# Patient Record
Sex: FEMALE | HISPANIC OR LATINO
[De-identification: names, ages, dates, MRNs, and addresses within clinical notes are randomized per-mention and may not be internally consistent; named-entity substitution may affect disease eponyms.]

---

## 2019-02-20 ENCOUNTER — RX ONLY (RX ONLY)
Age: 44
End: 2019-02-20

## 2019-02-20 ENCOUNTER — DOCTOR'S OFFICE (OUTPATIENT)
Dept: URBAN - METROPOLITAN AREA CLINIC 136 | Facility: CLINIC | Age: 44
Setting detail: OPHTHALMOLOGY
End: 2019-02-20
Payer: COMMERCIAL

## 2019-02-20 DIAGNOSIS — S05.21XD: ICD-10-CM

## 2019-02-20 PROCEDURE — 92004 COMPRE OPH EXAM NEW PT 1/>: CPT | Performed by: OPHTHALMOLOGY

## 2019-02-20 ASSESSMENT — CONFRONTATIONAL VISUAL FIELD TEST (CVF)
OS_FINDINGS: FULL
OD_FINDINGS: CONSTRICTION

## 2019-02-21 ASSESSMENT — REFRACTION_MANIFEST
OS_VA2: 20/
OD_VA3: 20/
OD_VA1: 20/
OD_VA1: 20/
OS_VA3: 20/
OS_VA1: 20/
OD_VA3: 20/
OU_VA: 20/
OU_VA: 20/
OS_VA3: 20/
OS_VA2: 20/
OS_VA1: 20/
OD_VA2: 20/
OD_VA2: 20/

## 2019-02-21 ASSESSMENT — VISUAL ACUITY
OD_BCVA: 20/20
OS_BCVA: 20/NLP

## 2019-02-21 ASSESSMENT — REFRACTION_CURRENTRX
OD_OVR_VA: 20/
OS_OVR_VA: 20/
OS_OVR_VA: 20/
OD_OVR_VA: 20/
OD_OVR_VA: 20/
OS_OVR_VA: 20/

## 2019-09-08 ENCOUNTER — APPOINTMENT (EMERGENCY)
Dept: CT IMAGING | Facility: HOSPITAL | Age: 44
End: 2019-09-08
Payer: COMMERCIAL

## 2019-09-08 ENCOUNTER — HOSPITAL ENCOUNTER (EMERGENCY)
Facility: HOSPITAL | Age: 44
Discharge: HOME | End: 2019-09-08
Attending: EMERGENCY MEDICINE | Admitting: EMERGENCY MEDICINE
Payer: COMMERCIAL

## 2019-09-08 VITALS
TEMPERATURE: 97.9 F | HEART RATE: 58 BPM | SYSTOLIC BLOOD PRESSURE: 145 MMHG | OXYGEN SATURATION: 99 % | BODY MASS INDEX: 38.13 KG/M2 | DIASTOLIC BLOOD PRESSURE: 87 MMHG | HEIGHT: 65 IN | RESPIRATION RATE: 16 BRPM | WEIGHT: 228.84 LBS

## 2019-09-08 DIAGNOSIS — K42.9 UMBILICAL HERNIA WITHOUT OBSTRUCTION OR GANGRENE: Primary | ICD-10-CM

## 2019-09-08 LAB
ALBUMIN SERPL BCP-MCNC: 3.4 G/DL (ref 3.5–5)
ALP SERPL-CCNC: 89 U/L (ref 46–116)
ALT SERPL W P-5'-P-CCNC: 30 U/L (ref 12–78)
ANION GAP SERPL CALCULATED.3IONS-SCNC: 5 MMOL/L (ref 4–13)
AST SERPL W P-5'-P-CCNC: 31 U/L (ref 5–45)
BACTERIA UR QL AUTO: ABNORMAL /HPF
BASOPHILS # BLD AUTO: 0.02 THOUSANDS/ΜL (ref 0–0.1)
BASOPHILS NFR BLD AUTO: 0 % (ref 0–1)
BILIRUB SERPL-MCNC: 0.3 MG/DL (ref 0.2–1)
BILIRUB UR QL STRIP: NEGATIVE
BUN SERPL-MCNC: 14 MG/DL (ref 5–25)
CALCIUM SERPL-MCNC: 8.6 MG/DL (ref 8.3–10.1)
CHLORIDE SERPL-SCNC: 105 MMOL/L (ref 100–108)
CLARITY UR: CLEAR
CO2 SERPL-SCNC: 26 MMOL/L (ref 21–32)
COLOR UR: YELLOW
CREAT SERPL-MCNC: 1.01 MG/DL (ref 0.6–1.3)
EOSINOPHIL # BLD AUTO: 0.1 THOUSAND/ΜL (ref 0–0.61)
EOSINOPHIL NFR BLD AUTO: 2 % (ref 0–6)
ERYTHROCYTE [DISTWIDTH] IN BLOOD BY AUTOMATED COUNT: 14 % (ref 11.6–15.1)
EXT PREG TEST URINE: NEGATIVE
EXT. CONTROL ED NAV: NORMAL
GFR SERPL CREATININE-BSD FRML MDRD: 69 ML/MIN/1.73SQ M
GLUCOSE SERPL-MCNC: 97 MG/DL (ref 65–140)
GLUCOSE UR STRIP-MCNC: NEGATIVE MG/DL
HCT VFR BLD AUTO: 37.9 % (ref 34.8–46.1)
HGB BLD-MCNC: 12.1 G/DL (ref 11.5–15.4)
HGB UR QL STRIP.AUTO: NEGATIVE
IMM GRANULOCYTES # BLD AUTO: 0.01 THOUSAND/UL (ref 0–0.2)
IMM GRANULOCYTES NFR BLD AUTO: 0 % (ref 0–2)
KETONES UR STRIP-MCNC: NEGATIVE MG/DL
LEUKOCYTE ESTERASE UR QL STRIP: NEGATIVE
LIPASE SERPL-CCNC: 214 U/L (ref 73–393)
LYMPHOCYTES # BLD AUTO: 0.69 THOUSANDS/ΜL (ref 0.6–4.47)
LYMPHOCYTES NFR BLD AUTO: 13 % (ref 14–44)
MCH RBC QN AUTO: 26.7 PG (ref 26.8–34.3)
MCHC RBC AUTO-ENTMCNC: 31.9 G/DL (ref 31.4–37.4)
MCV RBC AUTO: 84 FL (ref 82–98)
MONOCYTES # BLD AUTO: 0.48 THOUSAND/ΜL (ref 0.17–1.22)
MONOCYTES NFR BLD AUTO: 9 % (ref 4–12)
NEUTROPHILS # BLD AUTO: 4.09 THOUSANDS/ΜL (ref 1.85–7.62)
NEUTS SEG NFR BLD AUTO: 76 % (ref 43–75)
NITRITE UR QL STRIP: NEGATIVE
NON-SQ EPI CELLS URNS QL MICRO: ABNORMAL /HPF
NRBC BLD AUTO-RTO: 0 /100 WBCS
PH UR STRIP.AUTO: 5.5 [PH] (ref 4.5–8)
PLATELET # BLD AUTO: 183 THOUSANDS/UL (ref 149–390)
PMV BLD AUTO: 9.7 FL (ref 8.9–12.7)
POTASSIUM SERPL-SCNC: 4.2 MMOL/L (ref 3.5–5.3)
PROT SERPL-MCNC: 7.2 G/DL (ref 6.4–8.2)
PROT UR STRIP-MCNC: ABNORMAL MG/DL
RBC # BLD AUTO: 4.54 MILLION/UL (ref 3.81–5.12)
RBC #/AREA URNS AUTO: ABNORMAL /HPF
SODIUM SERPL-SCNC: 136 MMOL/L (ref 136–145)
SP GR UR STRIP.AUTO: 1.02 (ref 1–1.03)
UROBILINOGEN UR QL STRIP.AUTO: 0.2 E.U./DL
WBC # BLD AUTO: 5.39 THOUSAND/UL (ref 4.31–10.16)
WBC #/AREA URNS AUTO: ABNORMAL /HPF

## 2019-09-08 PROCEDURE — 83690 ASSAY OF LIPASE: CPT | Performed by: PHYSICIAN ASSISTANT

## 2019-09-08 PROCEDURE — 99284 EMERGENCY DEPT VISIT MOD MDM: CPT

## 2019-09-08 PROCEDURE — 81025 URINE PREGNANCY TEST: CPT | Performed by: PHYSICIAN ASSISTANT

## 2019-09-08 PROCEDURE — 74177 CT ABD & PELVIS W/CONTRAST: CPT

## 2019-09-08 PROCEDURE — 96374 THER/PROPH/DIAG INJ IV PUSH: CPT

## 2019-09-08 PROCEDURE — 36415 COLL VENOUS BLD VENIPUNCTURE: CPT | Performed by: PHYSICIAN ASSISTANT

## 2019-09-08 PROCEDURE — 85025 COMPLETE CBC W/AUTO DIFF WBC: CPT | Performed by: PHYSICIAN ASSISTANT

## 2019-09-08 PROCEDURE — 81001 URINALYSIS AUTO W/SCOPE: CPT

## 2019-09-08 PROCEDURE — 80053 COMPREHEN METABOLIC PANEL: CPT | Performed by: PHYSICIAN ASSISTANT

## 2019-09-08 PROCEDURE — 96375 TX/PRO/DX INJ NEW DRUG ADDON: CPT

## 2019-09-08 PROCEDURE — 96361 HYDRATE IV INFUSION ADD-ON: CPT

## 2019-09-08 PROCEDURE — 99284 EMERGENCY DEPT VISIT MOD MDM: CPT | Performed by: PHYSICIAN ASSISTANT

## 2019-09-08 RX ORDER — ONDANSETRON 2 MG/ML
4 INJECTION INTRAMUSCULAR; INTRAVENOUS ONCE
Status: COMPLETED | OUTPATIENT
Start: 2019-09-08 | End: 2019-09-08

## 2019-09-08 RX ORDER — KETOROLAC TROMETHAMINE 30 MG/ML
15 INJECTION, SOLUTION INTRAMUSCULAR; INTRAVENOUS ONCE
Status: COMPLETED | OUTPATIENT
Start: 2019-09-08 | End: 2019-09-08

## 2019-09-08 RX ORDER — ONDANSETRON 4 MG/1
4 TABLET, ORALLY DISINTEGRATING ORAL EVERY 6 HOURS PRN
Qty: 12 TABLET | Refills: 0 | Status: SHIPPED | OUTPATIENT
Start: 2019-09-08

## 2019-09-08 RX ADMIN — SODIUM CHLORIDE 500 ML: 0.9 INJECTION, SOLUTION INTRAVENOUS at 20:13

## 2019-09-08 RX ADMIN — ONDANSETRON 4 MG: 2 INJECTION INTRAMUSCULAR; INTRAVENOUS at 20:18

## 2019-09-08 RX ADMIN — IOHEXOL 100 ML: 350 INJECTION, SOLUTION INTRAVENOUS at 21:21

## 2019-09-08 RX ADMIN — KETOROLAC TROMETHAMINE 15 MG: 30 INJECTION, SOLUTION INTRAMUSCULAR at 20:20

## 2019-09-11 NOTE — ED PROVIDER NOTES
Pt Name: Anne Alex  MRN: [id number]  [place]: 1975  Age/Sex: 44 y o  female  Date of evaluation: 9/8/2019  PCP: Dallas Stiles MD    [address] [place] [address]    Chief Complaint   Patient presents with    Abdominal Pain     Pt presents to ED from home w/ abd pain for over a month  Pt had hernia repair over a year ago, pt states having similar pain to before hernia repair  Pt has been evaluated for this pain by multiple hospitals, w/ blood work, CT scans w/o diagnosis  Pt here for another evaluation  HPI    Sofia Mena presents to the Emergency Department complaining of Abdominal Pain, Hernia  Anne Alex is a 44 y o  female who presents due to Abdominal Pain, Hernia  Pt reports abd pain ongoing for over a month, sts she has had previous hernia repair over a year ago sequela lap cholecystectomy, states having similar pain to before incarcerated hernia repair  Pt has been evaluated for this pain by multiple hospitals, w/ blood work, CT scans and was referred to General surgeon with whom she does have an appointment, though sts now pain is more constant over the past few days  Pt here for another evaluation  Denies anorexia, melena, black/bloody stools, vomiting, diarrhea, constipation, obstipation, f/c/s, CP, SOB, back pain, flank pain, dysuria, and no other complaints at this time  History provided by:  Patient   used:  Yes    Abdominal Pain   Pain location:  Periumbilical  Pain quality: aching    Pain radiates to:  Does not radiate  Pain severity:  Moderate  Onset quality:  Gradual  Timing:  Constant  Progression:  Waxing and waning  Chronicity:  New  Context: previous surgery    Context: not alcohol use, not awakening from sleep, not diet changes, not eating, not laxative use, not medication withdrawal, not recent illness, not recent travel, not retching, not sick contacts, not suspicious food intake and not trauma    Relieved by:  Nothing  Worsened by: Palpation  Ineffective treatments:  None tried  Associated symptoms: nausea    Associated symptoms: no anorexia, no belching, no chest pain, no chills, no constipation, no cough, no diarrhea, no dysuria, no fatigue, no fever, no hematemesis, no hematochezia, no hematuria, no melena, no shortness of breath, no sore throat, no vaginal bleeding, no vaginal discharge and no vomiting    Risk factors: multiple surgeries and obesity    Risk factors: no alcohol abuse, no aspirin use, not elderly, no NSAID use, not pregnant and no recent hospitalization          Past Medical and Surgical History    History reviewed  No pertinent past medical history  Past Surgical History:   Procedure Laterality Date    CHOLECYSTECTOMY      HERNIA REPAIR      KIDNEY SURGERY         History reviewed  No pertinent family history  Social History     Tobacco Use    Smoking status: Never Smoker    Smokeless tobacco: Never Used   Substance Use Topics    Alcohol use: Not Currently    Drug use: Not on file           Allergies    Allergies   Allergen Reactions    Shrimp (Diagnostic) Throat Swelling       Home Medications:    Prior to Admission medications    Medication Sig Start Date End Date Taking? Authorizing Provider   ondansetron (ZOFRAN-ODT) 4 mg disintegrating tablet Take 1 tablet (4 mg total) by mouth every 6 (six) hours as needed for nausea 9/8/19   Amie Hamilton PA-C           Review of Systems    Review of Systems   Constitutional: Negative for activity change, appetite change, chills, diaphoresis, fatigue and fever  HENT: Negative for sore throat and trouble swallowing  Eyes: Negative for visual disturbance  Respiratory: Negative for apnea, cough, choking, chest tightness, shortness of breath, wheezing and stridor  Cardiovascular: Negative for chest pain, palpitations and leg swelling  Gastrointestinal: Positive for abdominal pain and nausea   Negative for abdominal distention, anorexia, constipation, diarrhea, hematemesis, hematochezia, melena and vomiting  Genitourinary: Negative for decreased urine volume, difficulty urinating, dysuria, flank pain, frequency, genital sores, hematuria, urgency, vaginal bleeding and vaginal discharge  Musculoskeletal: Negative for neck pain  Skin: Negative for rash and wound  Neurological: Negative for dizziness, weakness, light-headedness, numbness and headaches  Hematological: Negative for adenopathy  Psychiatric/Behavioral: The patient is not nervous/anxious  All other systems reviewed and negative  Physical Exam      ED Triage Vitals   Temperature Pulse Respirations Blood Pressure SpO2   09/08/19 1925 09/08/19 1925 09/08/19 1925 09/08/19 1925 09/08/19 1925   97 9 °F (36 6 °C) 69 16 170/85 98 %      Temp Source Heart Rate Source Patient Position - Orthostatic VS BP Location FiO2 (%)   09/08/19 1925 09/08/19 1925 09/08/19 1925 09/08/19 1925 --   Oral Monitor Sitting Right arm       Pain Score       09/08/19 2042       4               Physical Exam   Constitutional: She is oriented to person, place, and time  She appears well-developed and well-nourished  Non-toxic appearance  She does not appear ill  No distress  HENT:   Head: Normocephalic and atraumatic  Mouth/Throat: Oropharynx is clear and moist    Eyes: Pupils are equal, round, and reactive to light  EOM are normal    Neck: Normal range of motion  Neck supple  No hepatojugular reflux and no JVD present  Carotid bruit is not present  Cardiovascular: Normal rate, regular rhythm, normal heart sounds, intact distal pulses and normal pulses  No extrasystoles are present  PMI is not displaced  Exam reveals no gallop and no friction rub  No murmur heard  Pulses:       Radial pulses are 2+ on the right side, and 2+ on the left side  Dorsalis pedis pulses are 2+ on the right side, and 2+ on the left side  Posterior tibial pulses are 2+ on the right side, and 2+ on the left side  Pulmonary/Chest: Effort normal and breath sounds normal  No stridor  No respiratory distress  She has no wheezes  She has no rales  She exhibits no tenderness  Abdominal: Soft  Bowel sounds are normal  She exhibits no distension and no mass  There is no hepatosplenomegaly  There is tenderness (mild) in the periumbilical area  There is no rigidity, no rebound, no guarding, no CVA tenderness, no tenderness at McBurney's point and negative Hill's sign  A hernia is present  Hernia confirmed positive in the ventral area (and periumbilical)  Negative Hill's  Negative Appendiceal signs (Psoas, Rovsing's, Obturator)  Negative Peritoneal Signs  Obese abdomen   Musculoskeletal: Normal range of motion  Neurological: She is alert and oriented to person, place, and time  Skin: Skin is warm and dry  Capillary refill takes less than 2 seconds  She is not diaphoretic  Nursing note and vitals reviewed            Diagnostic Results        Labs:    Results for orders placed or performed during the hospital encounter of 09/08/19   CBC and differential   Result Value Ref Range    WBC 5 39 4 31 - 10 16 Thousand/uL    RBC 4 54 3 81 - 5 12 Million/uL    Hemoglobin 12 1 11 5 - 15 4 g/dL    Hematocrit 37 9 34 8 - 46 1 %    MCV 84 82 - 98 fL    MCH 26 7 (L) 26 8 - 34 3 pg    MCHC 31 9 31 4 - 37 4 g/dL    RDW 14 0 11 6 - 15 1 %    MPV 9 7 8 9 - 12 7 fL    Platelets 900 583 - 266 Thousands/uL    nRBC 0 /100 WBCs    Neutrophils Relative 76 (H) 43 - 75 %    Immat GRANS % 0 0 - 2 %    Lymphocytes Relative 13 (L) 14 - 44 %    Monocytes Relative 9 4 - 12 %    Eosinophils Relative 2 0 - 6 %    Basophils Relative 0 0 - 1 %    Neutrophils Absolute 4 09 1 85 - 7 62 Thousands/µL    Immature Grans Absolute 0 01 0 00 - 0 20 Thousand/uL    Lymphocytes Absolute 0 69 0 60 - 4 47 Thousands/µL    Monocytes Absolute 0 48 0 17 - 1 22 Thousand/µL    Eosinophils Absolute 0 10 0 00 - 0 61 Thousand/µL    Basophils Absolute 0 02 0 00 - 0 10 Thousands/µL   Comprehensive metabolic panel   Result Value Ref Range    Sodium 136 136 - 145 mmol/L    Potassium 4 2 3 5 - 5 3 mmol/L    Chloride 105 100 - 108 mmol/L    CO2 26 21 - 32 mmol/L    ANION GAP 5 4 - 13 mmol/L    BUN 14 5 - 25 mg/dL    Creatinine 1 01 0 60 - 1 30 mg/dL    Glucose 97 65 - 140 mg/dL    Calcium 8 6 8 3 - 10 1 mg/dL    AST 31 5 - 45 U/L    ALT 30 12 - 78 U/L    Alkaline Phosphatase 89 46 - 116 U/L    Total Protein 7 2 6 4 - 8 2 g/dL    Albumin 3 4 (L) 3 5 - 5 0 g/dL    Total Bilirubin 0 30 0 20 - 1 00 mg/dL    eGFR 69 ml/min/1 73sq m   Lipase   Result Value Ref Range    Lipase 214 73 - 393 u/L   Urine Microscopic   Result Value Ref Range    RBC, UA 0-1 (A) None Seen, 0-5 /hpf    WBC, UA 4-10 (A) None Seen, 0-5, 5-55, 5-65 /hpf    Epithelial Cells Moderate (A) None Seen, Occasional /hpf    Bacteria, UA Occasional None Seen, Occasional /hpf   POCT pregnancy, urine   Result Value Ref Range    EXT PREG TEST UR (Ref: Negative) negative     Control valid    ED Urine Macroscopic   Result Value Ref Range    Color, UA Yellow     Clarity, UA Clear     pH, UA 5 5 4 5 - 8 0    Leukocytes, UA Negative Negative    Nitrite, UA Negative Negative    Protein,  (2+) (A) Negative mg/dl    Glucose, UA Negative Negative mg/dl    Ketones, UA Negative Negative mg/dl    Urobilinogen, UA 0 2 0 2, 1 0 E U /dl E U /dl    Bilirubin, UA Negative Negative    Blood, UA Negative Negative    Specific Gravity, UA 1 025 1 003 - 1 030       All labs reviewed and utilized in the medical decision making process    Radiology:    CT abdomen pelvis with contrast   Final Result      Moderate sized fat-containing umbilical hernia which measures approximately 4 5 x 2 7 x 3 5 cm in size (axial image 56, series 2)  There is infiltration of the herniated fat suggesting a component of inflammation/fat necrosis  Correlation with the    patient's symptoms recommended  No involved bowel loops or evidence of bowel obstruction  Left kidney is surgically absent  Mild compensatory enlargement of the right kidney which measures approximately 14 5 cm in length  Extrarenal pelvis with mild hydronephrosis, 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  No evidence of    obstructive uropathy  Partially distended bladder  Mild circumferential bladder wall thickening noted, probably exaggerated by underdistention  Superimposed cystitis could be considered in the appropriate clinical setting  1 4 cm endometrial polyp is questioned  Consider nonemergent pelvic ultrasound for further evaluation  Colonic diverticulosis without evidence of acute diverticulitis, and other findings as above  Workstation performed: [id number]             All radiology studies independently viewed by me and interpreted by the radiologist     Procedure    Procedures      Assessment and Plan    MDM  Number of Diagnoses or Management Options  Umbilical hernia without obstruction or gangrene: established, worsening     Amount and/or Complexity of Data Reviewed  Clinical lab tests: ordered and reviewed  Tests in the radiology section of CPT®: ordered and reviewed  Tests in the medicine section of CPT®: reviewed and ordered  Review and summarize past medical records: yes  Independent visualization of images, tracings, or specimens: yes    Risk of Complications, Morbidity, and/or Mortality  Presenting problems: moderate  Diagnostic procedures: moderate  Management options: moderate    Patient Progress  Patient progress: stable      Initial ED assessment:  Sharon Herr is a 44 y o  female with significant PMH for GERD, Umbilical hernia repair, Obesity, HLD, CKD, Cholecystectomywho presents with Abdominal Pain, Hernia  Vitals signs reviewed and WNL  Physical examination remarkable for periumbilical TTP-mild, periumbilical hernia, reducible, ventral hernia      Initial Ddx  includes but is not limited to:  ileus, bowel obstruction, volvulus, internal hernia, strangulated hernia,  appendicitis, gastroenteritis, gastritis, PUD, GERD, gastroparesis, hepatitis, pancreatitis, colitis, enteritis, diverticulitis, food poisoning, mesenteric adenitis, mesenteric ischemia, IBD, IBS, AAA, perforated viscus, splenic etiology, constipation, pelvic pathology, renal colic, pyelonephritis, UTI; doubt cardiac etiology  Initial ED plan:   Plan will be to perform diagnostic testing of CBC, CMP, Lipase, CT abdomen and treat symptomatically with analgesia, IVF  Final ED summary/disposition: Discussed results of diagnostic testing with pt and family with permission and in detail  Home care recommendations given with discharge paperwork  Return to ED instructions given if new/worsening sxs  MDM  Reviewed: previous chart, nursing note and vitals  Interpretation: labs and CT scan        ED Course of Care and Re-Assessments    ED Course as of Sep 11 0626   Randee Cockayne Sep 08, 2019   2235 Fat necrosis without involvement of the bowel  CT abdomen pelvis with contrast                          Medications   ketorolac (TORADOL) injection 15 mg (15 mg Intravenous Given 9/8/19 2020)   sodium chloride 0 9 % bolus 500 mL (0 mL Intravenous Stopped 9/8/19 2304)   ondansetron (ZOFRAN) injection 4 mg (4 mg Intravenous Given 9/8/19 2018)   iohexol (OMNIPAQUE) 350 MG/ML injection (MULTI-DOSE) 100 mL (100 mL Intravenous Given 9/8/19 2121)         FINAL IMPRESSION    Final diagnoses:   Umbilical hernia without obstruction or gangrene         DISPOSITION/PLAN  Time reflects when diagnosis was documented in both MDM as applicable and the Disposition within this note     Time User Action Codes Description Comment    9/8/2019 10:37 PM Mynor  Add [L81 9] Umbilical hernia without obstruction or gangrene       ED Disposition     ED Disposition Condition Date/Time Comment    Discharge Stable Sun Sep 8, 2019 10:36 PM Rohit Loose discharge to home/self care              Follow-up Information     Follow up With Specialties Details Why Contact Info Additional Information    Ladan Mohan MD Family Medicine Go to  For follow up [address] [phone] 056 [facility] Emergency Department Emergency Medicine Go to  If symptoms worsen [address] [facility]  AN ED, [address], [place], [place], 809 [facility],  General Surgery Go to  For follow up [place] [address] [phone]                 PATIENT REFERRED TO:    Ladan Mohan MD  20 [facility] [address]  [phone]    Go to   For follow up    [facility] 107 Emergency [facility] 8 [address]  [phone]  Go to   If symptoms worsen    Serjio Dickerson,   [address] [place], [place] [address]e S  Liban Khadar Cornejo 3 [facility] 3  [phone]    Go to   For follow up      DISCHARGE MEDICATIONS:    There are no discharge medications for this patient  No discharge procedures on file           AALIYAH Fiore PA-C  09/11/19 6804

## 2020-01-20 ENCOUNTER — DOCTOR'S OFFICE (OUTPATIENT)
Dept: URBAN - METROPOLITAN AREA CLINIC 136 | Facility: CLINIC | Age: 45
Setting detail: OPHTHALMOLOGY
End: 2020-01-20
Payer: COMMERCIAL

## 2020-01-20 DIAGNOSIS — H04.121: ICD-10-CM

## 2020-01-20 DIAGNOSIS — S05.21XD: ICD-10-CM

## 2020-01-20 DIAGNOSIS — H26.101: ICD-10-CM

## 2020-01-20 PROCEDURE — 76512 OPH US DX B-SCAN: CPT | Performed by: OPHTHALMOLOGY

## 2020-01-20 PROCEDURE — 92014 COMPRE OPH EXAM EST PT 1/>: CPT | Performed by: OPHTHALMOLOGY

## 2020-01-20 ASSESSMENT — REFRACTION_CURRENTRX
OD_CYLINDER: 0.00
OS_CYLINDER: 0.00
OS_ADD: +1.00
OS_VPRISM_DIRECTION: BF
OS_OVR_VA: 20/
OD_OVR_VA: 20/
OD_ADD: +1.00
OS_SPHERE: +0.25
OD_VPRISM_DIRECTION: BF
OS_AXIS: 000
OD_AXIS: 000
OD_SPHERE: PLANO

## 2020-01-20 ASSESSMENT — VISUAL ACUITY
OS_BCVA: 20/NLP
OD_BCVA: 20/20

## 2020-01-20 ASSESSMENT — CONFRONTATIONAL VISUAL FIELD TEST (CVF)
OD_FINDINGS: CONSTRICTION
OS_FINDINGS: FULL

## 2022-01-21 ENCOUNTER — APPOINTMENT (EMERGENCY)
Dept: CT IMAGING | Facility: HOSPITAL | Age: 47
End: 2022-01-21
Payer: COMMERCIAL

## 2022-01-21 ENCOUNTER — HOSPITAL ENCOUNTER (EMERGENCY)
Facility: HOSPITAL | Age: 47
Discharge: HOME | End: 2022-01-21
Attending: EMERGENCY MEDICINE | Admitting: EMERGENCY MEDICINE
Payer: COMMERCIAL

## 2022-01-21 ENCOUNTER — APPOINTMENT (EMERGENCY)
Dept: RADIOLOGY | Facility: HOSPITAL | Age: 47
End: 2022-01-21
Payer: COMMERCIAL

## 2022-01-21 VITALS
OXYGEN SATURATION: 97 % | RESPIRATION RATE: 15 BRPM | HEART RATE: 52 BPM | TEMPERATURE: 97.8 F | DIASTOLIC BLOOD PRESSURE: 96 MMHG | SYSTOLIC BLOOD PRESSURE: 155 MMHG

## 2022-01-21 DIAGNOSIS — R51.9 HEADACHE: ICD-10-CM

## 2022-01-21 DIAGNOSIS — I10 HYPERTENSION: Primary | ICD-10-CM

## 2022-01-21 DIAGNOSIS — R06.00 DYSPNEA: ICD-10-CM

## 2022-01-21 LAB
2HR DELTA HS TROPONIN: 0 NG/L
ALBUMIN SERPL BCP-MCNC: 3.5 G/DL (ref 3.5–5)
ALP SERPL-CCNC: 93 U/L (ref 46–116)
ALT SERPL W P-5'-P-CCNC: 30 U/L (ref 12–78)
ANION GAP SERPL CALCULATED.3IONS-SCNC: 7 MMOL/L (ref 4–13)
APTT PPP: 30 SECONDS (ref 23–37)
AST SERPL W P-5'-P-CCNC: 21 U/L (ref 5–45)
ATRIAL RATE: 61 BPM
ATRIAL RATE: 73 BPM
BASOPHILS # BLD AUTO: 0.03 THOUSANDS/ΜL (ref 0–0.1)
BASOPHILS NFR BLD AUTO: 1 % (ref 0–1)
BILIRUB SERPL-MCNC: 0.22 MG/DL (ref 0.2–1)
BUN SERPL-MCNC: 18 MG/DL (ref 5–25)
CALCIUM SERPL-MCNC: 9.2 MG/DL (ref 8.3–10.1)
CARDIAC TROPONIN I PNL SERPL HS: 5 NG/L
CARDIAC TROPONIN I PNL SERPL HS: 5 NG/L
CHLORIDE SERPL-SCNC: 102 MMOL/L (ref 100–108)
CO2 SERPL-SCNC: 28 MMOL/L (ref 21–32)
CREAT SERPL-MCNC: 1.18 MG/DL (ref 0.6–1.3)
D DIMER PPP FEU-MCNC: 0.33 UG/ML FEU
EOSINOPHIL # BLD AUTO: 0.17 THOUSAND/ΜL (ref 0–0.61)
EOSINOPHIL NFR BLD AUTO: 3 % (ref 0–6)
ERYTHROCYTE [DISTWIDTH] IN BLOOD BY AUTOMATED COUNT: 13.3 % (ref 11.6–15.1)
GFR SERPL CREATININE-BSD FRML MDRD: 55 ML/MIN/1.73SQ M
GLUCOSE SERPL-MCNC: 94 MG/DL (ref 65–140)
HCG SERPL QL: NEGATIVE
HCT VFR BLD AUTO: 38.2 % (ref 34.8–46.1)
HGB BLD-MCNC: 12.4 G/DL (ref 11.5–15.4)
IMM GRANULOCYTES # BLD AUTO: 0.01 THOUSAND/UL (ref 0–0.2)
IMM GRANULOCYTES NFR BLD AUTO: 0 % (ref 0–2)
INR PPP: 1.01 (ref 0.84–1.19)
LYMPHOCYTES # BLD AUTO: 1.18 THOUSANDS/ΜL (ref 0.6–4.47)
LYMPHOCYTES NFR BLD AUTO: 18 % (ref 14–44)
MCH RBC QN AUTO: 28.2 PG (ref 26.8–34.3)
MCHC RBC AUTO-ENTMCNC: 32.5 G/DL (ref 31.4–37.4)
MCV RBC AUTO: 87 FL (ref 82–98)
MONOCYTES # BLD AUTO: 0.65 THOUSAND/ΜL (ref 0.17–1.22)
MONOCYTES NFR BLD AUTO: 10 % (ref 4–12)
NEUTROPHILS # BLD AUTO: 4.42 THOUSANDS/ΜL (ref 1.85–7.62)
NEUTS SEG NFR BLD AUTO: 68 % (ref 43–75)
NRBC BLD AUTO-RTO: 0 /100 WBCS
NT-PROBNP SERPL-MCNC: 69 PG/ML
P AXIS: 102 DEGREES
P AXIS: 112 DEGREES
PLATELET # BLD AUTO: 209 THOUSANDS/UL (ref 149–390)
PMV BLD AUTO: 10.1 FL (ref 8.9–12.7)
POTASSIUM SERPL-SCNC: 3.6 MMOL/L (ref 3.5–5.3)
PR INTERVAL: 126 MS
PR INTERVAL: 136 MS
PROT SERPL-MCNC: 7.3 G/DL (ref 6.4–8.2)
PROTHROMBIN TIME: 13.3 SECONDS (ref 11.6–14.5)
QRS AXIS: 107 DEGREES
QRS AXIS: 136 DEGREES
QRSD INTERVAL: 84 MS
QRSD INTERVAL: 86 MS
QT INTERVAL: 378 MS
QT INTERVAL: 424 MS
QTC INTERVAL: 411 MS
QTC INTERVAL: 413 MS
RBC # BLD AUTO: 4.4 MILLION/UL (ref 3.81–5.12)
SODIUM SERPL-SCNC: 137 MMOL/L (ref 136–145)
T WAVE AXIS: 44 DEGREES
T WAVE AXIS: 46 DEGREES
VENTRICULAR RATE: 57 BPM
VENTRICULAR RATE: 71 BPM
WBC # BLD AUTO: 6.46 THOUSAND/UL (ref 4.31–10.16)

## 2022-01-21 PROCEDURE — 83880 ASSAY OF NATRIURETIC PEPTIDE: CPT | Performed by: EMERGENCY MEDICINE

## 2022-01-21 PROCEDURE — 70450 CT HEAD/BRAIN W/O DYE: CPT

## 2022-01-21 PROCEDURE — 99284 EMERGENCY DEPT VISIT MOD MDM: CPT

## 2022-01-21 PROCEDURE — 93010 ELECTROCARDIOGRAM REPORT: CPT | Performed by: INTERNAL MEDICINE

## 2022-01-21 PROCEDURE — G1004 CDSM NDSC: HCPCS

## 2022-01-21 PROCEDURE — 93005 ELECTROCARDIOGRAM TRACING: CPT

## 2022-01-21 PROCEDURE — 85610 PROTHROMBIN TIME: CPT | Performed by: EMERGENCY MEDICINE

## 2022-01-21 PROCEDURE — 36415 COLL VENOUS BLD VENIPUNCTURE: CPT | Performed by: EMERGENCY MEDICINE

## 2022-01-21 PROCEDURE — 85730 THROMBOPLASTIN TIME PARTIAL: CPT | Performed by: EMERGENCY MEDICINE

## 2022-01-21 PROCEDURE — 80053 COMPREHEN METABOLIC PANEL: CPT | Performed by: EMERGENCY MEDICINE

## 2022-01-21 PROCEDURE — 84484 ASSAY OF TROPONIN QUANT: CPT | Performed by: EMERGENCY MEDICINE

## 2022-01-21 PROCEDURE — 71045 X-RAY EXAM CHEST 1 VIEW: CPT

## 2022-01-21 PROCEDURE — 84703 CHORIONIC GONADOTROPIN ASSAY: CPT | Performed by: EMERGENCY MEDICINE

## 2022-01-21 PROCEDURE — 85379 FIBRIN DEGRADATION QUANT: CPT | Performed by: EMERGENCY MEDICINE

## 2022-01-21 PROCEDURE — 85025 COMPLETE CBC W/AUTO DIFF WBC: CPT | Performed by: EMERGENCY MEDICINE

## 2022-01-21 PROCEDURE — 99285 EMERGENCY DEPT VISIT HI MDM: CPT | Performed by: EMERGENCY MEDICINE

## 2022-01-21 RX ORDER — ACETAMINOPHEN 325 MG/1
975 TABLET ORAL ONCE
Status: COMPLETED | OUTPATIENT
Start: 2022-01-21 | End: 2022-01-21

## 2022-01-21 RX ADMIN — ACETAMINOPHEN 975 MG: 325 TABLET, FILM COATED ORAL at 02:22

## 2022-01-21 NOTE — ED PROVIDER NOTES
History  Chief Complaint   Patient presents with    Hypertension     pt reports hypertension since last week, pt reports headache  pt went to other ER twice and reports "they didn't do anything"     Patient is a 40year old female with about 1 week of worsening headache and sob and high blood pressure  States compliance with BP medication  Saw cardiology yesterday and started on HCTZ  Was seen at [place] [address] ED yesterday as well for HTN and headache  No fever or cough  No chest pain  No SAH in family  No N/V  No blurry vision  LMP - 1 week ago  Was last seen in this ED on 8/4/96 for umbilical hernia  [facility] website checked on this patient and no Rx found  History provided by:  Patient (sister in law)   used: Yes    Hypertension  Associated symptoms: headaches and shortness of breath    Associated symptoms: no chest pain, no fever, no nausea and not vomiting        Prior to Admission Medications   Prescriptions Last Dose Informant Patient Reported? Taking?   ondansetron (ZOFRAN-ODT) 4 mg disintegrating tablet   No No   Sig: Take 1 tablet (4 mg total) by mouth every 6 (six) hours as needed for nausea      Facility-Administered Medications: None       History reviewed  No pertinent past medical history  Past Surgical History:   Procedure Laterality Date    CHOLECYSTECTOMY      HERNIA REPAIR      KIDNEY SURGERY         History reviewed  No pertinent family history  I have reviewed and agree with the history as documented  E-Cigarette/Vaping     E-Cigarette/Vaping Substances     Social History     Tobacco Use    Smoking status: Never Smoker    Smokeless tobacco: Never Used   Substance Use Topics    Alcohol use: Not Currently    Drug use: Not on file       Review of Systems   Constitutional: Negative for fever  Eyes: Negative for visual disturbance (except 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/p trauma)  Respiratory: Positive for shortness of breath  Negative for cough  Cardiovascular: Negative for chest pain  Gastrointestinal: Negative for nausea and vomiting  Genitourinary: Negative for difficulty urinating  Neurological: Positive for headaches  All other systems reviewed and are negative  Physical Exam  Physical Exam  Vitals and nursing note reviewed  Constitutional:       General: She is in acute distress (moderate)  Appearance: She is not ill-appearing or toxic-appearing  HENT:      Head: Normocephalic and atraumatic  Mouth/Throat:      Comments: Mask in place  Eyes:      General: No scleral icterus  Extraocular Movements: Extraocular movements intact  Pupils: Pupils are equal, round, and reactive to light  Cardiovascular:      Rate and Rhythm: Normal rate and regular rhythm  Heart sounds: Normal heart sounds  No murmur heard  Pulmonary:      Effort: Pulmonary effort is normal  No respiratory distress  Breath sounds: Normal breath sounds  No stridor  No wheezing, rhonchi or rales  Abdominal:      General: Bowel sounds are normal       Palpations: Abdomen is soft  Tenderness: There is no abdominal tenderness  Musculoskeletal:         General: No deformity  Cervical back: Normal range of motion and neck supple  No rigidity  Right lower leg: No edema  Left lower leg: No edema  Skin:     General: Skin is warm and dry  Findings: No erythema or rash  Neurological:      General: No focal deficit present  Mental Status: She is alert and oriented to person, place, and time  Psychiatric:      Comments: Somewhat anxious            Vital Signs  ED Triage Vitals   Temperature Pulse Respirations Blood Pressure SpO2   01/21/22 0144 01/21/22 0144 01/21/22 0144 01/21/22 0144 01/21/22 0144   97 8 °F (36 6 °C) 66 20 (!) 172/85 99 %      Temp Source Heart Rate Source Patient Position - Orthostatic VS BP Location FiO2 (%)   01/21/22 0144 01/21/22 0144 01/21/22 0144 01/21/22 0144 --   Oral Monitor Sitting Left arm       Pain Score       01/21/22 0222       5           Vitals:    01/21/22 0144 01/21/22 0230 01/21/22 0400   BP: (!) 172/85 154/86 155/96   Pulse: 66 57 (!) 52   Patient Position - Orthostatic VS: Sitting Sitting          Visual Acuity  Visual Acuity      Most Recent Value   L Pupil Size (mm) 2   R Pupil Size (mm) --  [unable to assess]          ED Medications  Medications   acetaminophen (TYLENOL) tablet 975 mg (975 mg Oral Given 1/21/22 0222)       Diagnostic Studies  Results Reviewed     Procedure Component Value Units Date/Time    HS Troponin I 2hr [[id number]] Collected: 01/21/22 0418    Lab Status: Final result Specimen: Blood from Arm, Left Updated: 01/21/22 0549     hs TnI 2hr 5 ng/L      Delta 2hr hsTnI 0 ng/L     HS Troponin I 4hr [[id number]]     Lab Status: No result Specimen: Blood     hCG, qualitative pregnancy [[id number]]  (Normal) Collected: 01/21/22 0224    Lab Status: Final result Specimen: Blood from Arm, Left Updated: 01/21/22 0341     Preg, Serum Negative    NT-BNP PRO [[id number]]  (Normal) Collected: 01/21/22 0224    Lab Status: Final result Specimen: Blood from Arm, Left Updated: 01/21/22 0307     NT-proBNP 69 pg/mL     HS Troponin 0hr (reflex protocol) [[id number]] Collected: 01/21/22 0224    Lab Status: Final result Specimen: Blood from Arm, Left Updated: 01/21/22 0306     hs TnI 0hr 5 ng/L     D-Dimer [[id number]]  (Normal) Collected: 01/21/22 0224    Lab Status: Final result Specimen: Blood from Arm, Left Updated: 01/21/22 0301     D-Dimer, Quant 0 33 ug/ml FEU     Comprehensive metabolic panel [[id number]] Collected: 01/21/22 0224    Lab Status: Final result Specimen: Blood from Arm, Left Updated: 01/21/22 0300     Sodium 137 mmol/L      Potassium 3 6 mmol/L      Chloride 102 mmol/L      CO2 28 mmol/L      ANION GAP 7 mmol/L      BUN 18 mg/dL      Creatinine 1 18 mg/dL      Glucose 94 mg/dL      Calcium 9 2 mg/dL      AST 21 U/L      ALT 30 U/L      Alkaline Phosphatase 93 U/L Total Protein 7 3 g/dL      Albumin 3 5 g/dL      Total Bilirubin 0 22 mg/dL      eGFR 55 ml/min/1 73sq m     Narrative:      National Kid[employer] Disease Foundation guidelines for Chronic Kidney Disease (CKD):     Stage 1 with normal or high GFR (GFR > 90 mL/min/1 73 square meters)    Stage 2 Mild CKD (GFR = 60-89 mL/min/1 73 square meters)    Stage 3A Moderate CKD (GFR = 45-59 mL/min/1 73 square meters)    Stage 3B Moderate CKD (GFR = 30-44 mL/min/1 73 square meters)    Stage 4 Severe CKD (GFR = 15-29 mL/min/1 73 square meters)    Stage 5 End Stage CKD (GFR <15 mL/min/1 73 square meters)  Note: GFR calculation is accurate only with a steady state creatinine    Protime-INR [[id number]]  (Normal) Collected: 01/21/22 0224    Lab Status: Final result Specimen: Blood from Arm, Left Updated: 01/21/22 0259     Protime 13 3 seconds      INR 1 01    APTT [[id number]]  (Normal) Collected: 01/21/22 0224    Lab Status: Final result Specimen: Blood from Arm, Left Updated: 01/21/22 0259     PTT 30 seconds     CBC and differential [[id number]] Collected: 01/21/22 0224    Lab Status: Final result Specimen: Blood from Arm, Left Updated: 01/21/22 0246     WBC 6 46 Thousand/uL      RBC 4 40 Million/uL      Hemoglobin 12 4 g/dL      Hematocrit 38 2 %      MCV 87 fL      MCH 28 2 pg      MCHC 32 5 g/dL      RDW 13 3 %      MPV 10 1 fL      Platelets 134 Thousands/uL      nRBC 0 /100 WBCs      Neutrophils Relative 68 %      Immat GRANS % 0 %      Lymphocytes Relative 18 %      Monocytes Relative 10 %      Eosinophils Relative 3 %      Basophils Relative 1 %      Neutrophils Absolute 4 42 Thousands/µL      Immature Grans Absolute 0 01 Thousand/uL      Lymphocytes Absolute 1 18 Thousands/µL      Monocytes Absolute 0 65 Thousand/µL      Eosinophils Absolute 0 17 Thousand/µL      Basophils Absolute 0 03 Thousands/µL                  CT head without contrast   ED Interpretation by Kirill Moreland MD (01/21 3988) FINDINGS:     PARENCHYMA:  No intracranial mass, mass effect or midline shift  No CT signs of acute infarction  No acute parenchymal hemorrhage      VENTRICLES AND EXTRA-AXIAL SPACES:  Normal for the patient's age      VISUALIZED ORBITS AND PARANASAL SINUSES:  Indeterminate age, likely chronic, fracture of the right medial orbital wall/lamina papyracea  Recommend clinical correlation        CALVARIUM AND EXTRACRANIAL SOFT TISSUES:  Normal      IMPRESSION:     No acute intracranial abnormality         Indeterminate age, likely chronic, fracture of the right medial orbital wall/lamina papyracea  Recommend clinical correlation    Abnormal appearance of the right globe  Recommend clinical correlation  I personally discussed this study with Som Gonzalez on 1/21/2022 at 3:48 AM                  Workstation performed: [id number]      Final Result by Lacy Powell MD (01/21 6521)      No acute intracranial abnormality  Indeterminate age, likely chronic, fracture of the right medial orbital wall/lamina papyracea  Recommend clinical correlation    Abnormal appearance of the right globe  Recommend clinical correlation  I personally discussed this study with Som Gonzalez on 1/21/2022 at 3:48 AM                   Workstation performed: [id number]         XR chest 1 view portable   ED Interpretation by Alisa Jarquin MD (01/21 1376)   No acute disease read by me  Final Result by Lacy Powell MD (01/21 9311)      No acute cardiopulmonary disease  Findings concur with the preliminary report by the referring clinician already in PACS and/or our electronic record EPIC        Workstation performed: [id number]                    Procedures  ECG 12 Lead Documentation Only    Date/Time: 1/21/2022 2:01 AM  Performed by: Alisa Jarquin MD  Authorized by: Alisa Jarquin MD     Indications / Diagnosis:  Sob, HTN  ECG reviewed by me, the ED Provider: yes    Patient location: ED  Previous ECG:     Previous ECG:  Unavailable  Quality:     ECG tracing quality: suspect arm lead reversal   Rate:     ECG rate:  71    ECG rate assessment: normal    Rhythm:     Rhythm: sinus rhythm    Ectopy:     Ectopy: none    QRS:     QRS axis:  Right  Conduction:     Conduction: normal    ST segments:     ST segments:  Normal  T waves:     T waves: non-specific      ECG 12 Lead Documentation Only    Date/Time: 1/21/2022 4:23 AM  Performed by: Jonn Hook MD  Authorized by: Jonn Hook MD     Indications / Diagnosis:  Repeat EKG #2 for sob  ECG reviewed by me, the ED Provider: yes    Patient location:  ED  Previous ECG:     Previous ECG:  Compared to current    Comparison ECG info:  Earlier tonight    Similarity:  Changes noted (s  rodriguez now)  Rate:     ECG rate:  57    ECG rate assessment: bradycardic    Rhythm:     Rhythm: sinus bradycardia    Ectopy:     Ectopy: none    QRS:     QRS axis:  Right  Conduction:     Conduction: normal    ST segments:     ST segments:  Normal  T waves:     T waves: non-specific               ED Course  ED Course as of 01/21/22 0555   Fri Jan 21, 2022   0251 Blood Pressure: 154/86  BP improved  2372 Labs and EKG and CXR d/w patient and sister in law with patient's permission  Patient resting comfortably on re-evaluation  5797 CT head d/w patient and sister in law  Patient had fall down stairs and trauma to R eye and has no vision in R eye since 2000     0550 Repeat EKG and troponin d/w patient and sister in law  Patient was sleeping prior to discharge                HEART Risk Score      Most Recent Value   Heart Score Risk Calculator    History 1 Filed at: 01/21/2022 0310   ECG 1 Filed at: 01/21/2022 0310   Age 0 Filed at: 01/21/2022 0310   Risk Factors 1 Filed at: 01/21/2022 0310   Troponin 0 Filed at: 01/21/2022 0310   HEART Score 3 Filed at: 01/21/2022 0310                PERC Rule for PE      Most Recent Value   PERC Rule for PE    Age >=50 0 Filed at: 01/21/2022 0239   HR >=100 0 Filed at: 01/21/2022 0239   O2 Sat on room air < 95% 0 Filed at: 01/21/2022 0239   History of PE or DVT 0 Filed at: 01/21/2022 0872   Recent trauma or surgery 0 Filed at: 01/21/2022 0239   Hemoptysis 0 Filed at: 01/21/2022 0239   Exogenous estrogen 0 Filed at: 01/21/2022 0239   Unilateral leg swelling 0 Filed at: 01/21/2022 0239   PERC Rule for PE Results 0 Filed at: 01/21/2022 4544              SBIRT 20yo+      Most Recent Value   SBIRT (25 yo +)    In order to provide better care to our patients, we are screening all of our patients for alcohol and drug use  Would it be okay to ask you these screening questions? Unable to answer at this time Filed at: 01/21/2022 Ced Collazo Criteria for PE      Most Recent Value   Wells' Criteria for PE    Clinical signs and symptoms of DVT 0 Filed at: 01/21/2022 9349   PE is primary diagnosis or equally likely 3 Filed at: 01/21/2022 0240   HR >100 0 Filed at: 01/21/2022 0240   Immobilization at least 3 days or Surgery in the previous 4 weeks 0 Filed at: 01/21/2022 0240   Previous, objectively diagnosed PE or DVT 0 Filed at: 01/21/2022 0240   Hemoptysis 0 Filed at: 01/21/2022 0240   Malignancy with treatment within 6 months or palliative 0 Filed at: 01/21/2022 0240   Wells' Criteria Total 3 Filed at: 01/21/2022 0240                MDM  Number of Diagnoses or Management Options  Diagnosis management comments: DDX including but not limited to: pneumonia, pleural effusion, CHF, PE, PTX, ACS, MI, asthma exacerbation, COPD exacerbation, doubt COVID 19, anemia, metabolic abnormality, renal failure  DDx including but not limited to: tension headache, cluster headache, migraine; doubt ICH, SAH, tumor, meningitis, temporal arteritis, carbon monoxide poisoning, zoster, sinusitis          Amount and/or Complexity of Data Reviewed  Clinical lab tests: ordered and reviewed  Tests in the radiology section of CPT®: ordered and reviewed  Decide to obtain previous medical records or to obtain history from someone other than the patient: yes  Obtain history from someone other than the patient: yes  Review and summarize past medical records: yes  Independent visualization of images, tracings, or specimens: yes        Disposition  Final diagnoses:   Hypertension   Headache   Dyspnea     Time reflects when diagnosis was documented in both MDM as applicable and the Disposition within this note     Time User Action Codes Description Comment    1/21/2022  3:03 AM Christal Or Add [I10] Hypertension     1/21/2022  3:03 AM Christal Or Add [R51 9] Headache     1/21/2022  3:03 AM Christal Or Add [R06 00] Dyspnea       ED Disposition     ED Disposition Condition Date/Time Comment    Discharge Stable Fri Jan 21, 2022  5:54 AM Samantha Milian discharge to home/self care  Follow-up Information     Follow up With Specialties Details Why 92 Jordy Dumont MD Family Medicine Call in 1 day and own cardiologist for repeat blood pressure in 1 week  Return sooner if increased pain, worsening high blood pressure, worsening difficulty breathing, fever, vomiting, cough  tylenol for pain  [address]  [phone]            Patient's Medications   Discharge Prescriptions    No medications on file       No discharge procedures on file      PDMP Review       Value Time User    PDMP Reviewed  Yes 1/21/2022  2:05 Sara Dickey MD          ED Provider  Electronically Signed by           Hiwot Horton MD  01/21/22 0570

## 2022-12-05 ENCOUNTER — VBI (OUTPATIENT)
Dept: ADMINISTRATIVE | Facility: OTHER | Age: 47
End: 2022-12-05

## 2022-12-29 ENCOUNTER — VBI (OUTPATIENT)
Dept: ADMINISTRATIVE | Facility: OTHER | Age: 47
End: 2022-12-29

## 2022-12-29 NOTE — TELEPHONE ENCOUNTER
12/29/22 11:47 AM     VB [employer] SmartForm used to document caregap status      Afsaneh Dickerson MA